# Patient Record
Sex: FEMALE | Race: WHITE | NOT HISPANIC OR LATINO | URBAN - METROPOLITAN AREA
[De-identification: names, ages, dates, MRNs, and addresses within clinical notes are randomized per-mention and may not be internally consistent; named-entity substitution may affect disease eponyms.]

---

## 2017-03-06 ENCOUNTER — COMMUNICATION - HEALTHEAST (OUTPATIENT)
Dept: INFECTIOUS DISEASES | Facility: CLINIC | Age: 82
End: 2017-03-06

## 2017-03-06 DIAGNOSIS — N39.0 RECURRENT UTI: ICD-10-CM

## 2017-03-09 ENCOUNTER — AMBULATORY - HEALTHEAST (OUTPATIENT)
Dept: INFECTIOUS DISEASES | Facility: CLINIC | Age: 82
End: 2017-03-09

## 2017-03-13 ENCOUNTER — COMMUNICATION - HEALTHEAST (OUTPATIENT)
Dept: FAMILY MEDICINE | Facility: CLINIC | Age: 82
End: 2017-03-13

## 2017-03-13 ENCOUNTER — OFFICE VISIT - HEALTHEAST (OUTPATIENT)
Dept: FAMILY MEDICINE | Facility: CLINIC | Age: 82
End: 2017-03-13

## 2017-03-13 DIAGNOSIS — R53.83 FATIGUE: ICD-10-CM

## 2017-03-13 DIAGNOSIS — K22.2 ESOPHAGEAL STRICTURE: ICD-10-CM

## 2017-03-13 LAB
ATRIAL RATE - MUSE: 65 BPM
DIASTOLIC BLOOD PRESSURE - MUSE: NORMAL MMHG
HBA1C MFR BLD: 5.6 % (ref 3.5–6)
INTERPRETATION ECG - MUSE: NORMAL
P AXIS - MUSE: 42 DEGREES
PR INTERVAL - MUSE: 170 MS
QRS DURATION - MUSE: 66 MS
QT - MUSE: 402 MS
QTC - MUSE: 418 MS
R AXIS - MUSE: 9 DEGREES
SYSTOLIC BLOOD PRESSURE - MUSE: NORMAL MMHG
T AXIS - MUSE: 27 DEGREES
VENTRICULAR RATE- MUSE: 65 BPM

## 2017-03-14 ENCOUNTER — AMBULATORY - HEALTHEAST (OUTPATIENT)
Dept: LAB | Facility: CLINIC | Age: 82
End: 2017-03-14

## 2017-03-14 ENCOUNTER — COMMUNICATION - HEALTHEAST (OUTPATIENT)
Dept: FAMILY MEDICINE | Facility: CLINIC | Age: 82
End: 2017-03-14

## 2017-03-14 DIAGNOSIS — N39.0 CHRONIC UTI: ICD-10-CM

## 2017-03-14 DIAGNOSIS — R53.83 FATIGUE: ICD-10-CM

## 2017-03-22 ENCOUNTER — COMMUNICATION - HEALTHEAST (OUTPATIENT)
Dept: FAMILY MEDICINE | Facility: CLINIC | Age: 82
End: 2017-03-22

## 2017-03-22 DIAGNOSIS — N39.0 URINARY TRACT INFECTION: ICD-10-CM

## 2017-03-31 ENCOUNTER — OFFICE VISIT - HEALTHEAST (OUTPATIENT)
Dept: NURSING | Facility: CLINIC | Age: 82
End: 2017-03-31

## 2017-03-31 DIAGNOSIS — R53.83 FATIGUE, UNSPECIFIED TYPE: ICD-10-CM

## 2017-03-31 DIAGNOSIS — I10 ESSENTIAL HYPERTENSION: ICD-10-CM

## 2017-03-31 DIAGNOSIS — Z79.899 POLYPHARMACY: ICD-10-CM

## 2017-03-31 DIAGNOSIS — I10 ESSENTIAL HYPERTENSION WITH GOAL BLOOD PRESSURE LESS THAN 140/90: ICD-10-CM

## 2017-03-31 DIAGNOSIS — N39.0 CHRONIC UTI: ICD-10-CM

## 2017-03-31 DIAGNOSIS — K58.0 IRRITABLE BOWEL SYNDROME WITH DIARRHEA: ICD-10-CM

## 2017-04-24 ENCOUNTER — COMMUNICATION - HEALTHEAST (OUTPATIENT)
Dept: FAMILY MEDICINE | Facility: CLINIC | Age: 82
End: 2017-04-24

## 2017-04-24 DIAGNOSIS — R30.0 DYSURIA: ICD-10-CM

## 2017-04-25 ENCOUNTER — OFFICE VISIT - HEALTHEAST (OUTPATIENT)
Dept: FAMILY MEDICINE | Facility: CLINIC | Age: 82
End: 2017-04-25

## 2017-04-25 DIAGNOSIS — R10.9 ABDOMINAL PAIN: ICD-10-CM

## 2017-04-25 ASSESSMENT — MIFFLIN-ST. JEOR: SCORE: 1249.91

## 2017-04-26 ENCOUNTER — RECORDS - HEALTHEAST (OUTPATIENT)
Dept: ADMINISTRATIVE | Facility: OTHER | Age: 82
End: 2017-04-26

## 2017-04-28 ENCOUNTER — COMMUNICATION - HEALTHEAST (OUTPATIENT)
Dept: FAMILY MEDICINE | Facility: CLINIC | Age: 82
End: 2017-04-28

## 2017-04-28 ENCOUNTER — AMBULATORY - HEALTHEAST (OUTPATIENT)
Dept: FAMILY MEDICINE | Facility: CLINIC | Age: 82
End: 2017-04-28

## 2017-04-28 DIAGNOSIS — N39.0 UTI DUE TO KLEBSIELLA SPECIES: ICD-10-CM

## 2017-04-28 DIAGNOSIS — B96.89 UTI DUE TO KLEBSIELLA SPECIES: ICD-10-CM

## 2017-05-01 ENCOUNTER — COMMUNICATION - HEALTHEAST (OUTPATIENT)
Dept: FAMILY MEDICINE | Facility: CLINIC | Age: 82
End: 2017-05-01

## 2017-05-23 ENCOUNTER — COMMUNICATION - HEALTHEAST (OUTPATIENT)
Dept: INFECTIOUS DISEASES | Facility: CLINIC | Age: 82
End: 2017-05-23

## 2017-05-24 ENCOUNTER — RECORDS - HEALTHEAST (OUTPATIENT)
Dept: ADMINISTRATIVE | Facility: OTHER | Age: 82
End: 2017-05-24

## 2017-06-20 ENCOUNTER — RECORDS - HEALTHEAST (OUTPATIENT)
Dept: ADMINISTRATIVE | Facility: OTHER | Age: 82
End: 2017-06-20

## 2017-08-03 ENCOUNTER — RECORDS - HEALTHEAST (OUTPATIENT)
Dept: ADMINISTRATIVE | Facility: OTHER | Age: 82
End: 2017-08-03

## 2017-08-17 ENCOUNTER — AMBULATORY - HEALTHEAST (OUTPATIENT)
Dept: NURSING | Facility: CLINIC | Age: 82
End: 2017-08-17

## 2017-08-21 ENCOUNTER — OFFICE VISIT - HEALTHEAST (OUTPATIENT)
Dept: FAMILY MEDICINE | Facility: CLINIC | Age: 82
End: 2017-08-21

## 2017-08-21 DIAGNOSIS — M25.512 SHOULDER PAIN, BILATERAL: ICD-10-CM

## 2017-08-21 DIAGNOSIS — M25.511 SHOULDER PAIN, BILATERAL: ICD-10-CM

## 2017-08-22 ENCOUNTER — COMMUNICATION - HEALTHEAST (OUTPATIENT)
Dept: FAMILY MEDICINE | Facility: CLINIC | Age: 82
End: 2017-08-22

## 2017-09-26 ENCOUNTER — COMMUNICATION - HEALTHEAST (OUTPATIENT)
Dept: FAMILY MEDICINE | Facility: CLINIC | Age: 82
End: 2017-09-26

## 2017-09-26 DIAGNOSIS — F41.1 ANXIETY STATE: ICD-10-CM

## 2017-11-19 ENCOUNTER — RECORDS - HEALTHEAST (OUTPATIENT)
Dept: ADMINISTRATIVE | Facility: OTHER | Age: 82
End: 2017-11-19

## 2017-12-06 ENCOUNTER — COMMUNICATION - HEALTHEAST (OUTPATIENT)
Dept: FAMILY MEDICINE | Facility: CLINIC | Age: 82
End: 2017-12-06

## 2017-12-06 DIAGNOSIS — I10 HTN (HYPERTENSION): ICD-10-CM

## 2018-01-15 ENCOUNTER — COMMUNICATION - HEALTHEAST (OUTPATIENT)
Dept: FAMILY MEDICINE | Facility: CLINIC | Age: 83
End: 2018-01-15

## 2018-01-29 ENCOUNTER — OFFICE VISIT - HEALTHEAST (OUTPATIENT)
Dept: FAMILY MEDICINE | Facility: CLINIC | Age: 83
End: 2018-01-29

## 2018-01-29 DIAGNOSIS — Z01.818 ENCOUNTER FOR PREOPERATIVE EXAMINATION FOR GENERAL SURGICAL PROCEDURE: ICD-10-CM

## 2018-01-29 ASSESSMENT — MIFFLIN-ST. JEOR: SCORE: 1128.63

## 2018-04-23 ENCOUNTER — COMMUNICATION - HEALTHEAST (OUTPATIENT)
Dept: FAMILY MEDICINE | Facility: CLINIC | Age: 83
End: 2018-04-23

## 2018-04-23 DIAGNOSIS — F41.1 ANXIETY STATE: ICD-10-CM

## 2018-07-31 ENCOUNTER — COMMUNICATION - HEALTHEAST (OUTPATIENT)
Dept: SCHEDULING | Facility: CLINIC | Age: 83
End: 2018-07-31

## 2018-09-05 ENCOUNTER — OFFICE VISIT - HEALTHEAST (OUTPATIENT)
Dept: FAMILY MEDICINE | Facility: CLINIC | Age: 83
End: 2018-09-05

## 2018-09-05 DIAGNOSIS — F41.1 ANXIETY STATE: ICD-10-CM

## 2018-09-05 DIAGNOSIS — G47.00 INSOMNIA: ICD-10-CM

## 2018-09-05 DIAGNOSIS — R51.9 HEADACHE: ICD-10-CM

## 2018-09-05 LAB
ALBUMIN SERPL-MCNC: 3.9 G/DL (ref 3.5–5)
ALP SERPL-CCNC: 86 U/L (ref 45–120)
ALT SERPL W P-5'-P-CCNC: 11 U/L (ref 0–45)
ANION GAP SERPL CALCULATED.3IONS-SCNC: 10 MMOL/L (ref 5–18)
AST SERPL W P-5'-P-CCNC: 15 U/L (ref 0–40)
ATRIAL RATE - MUSE: 57 BPM
BASOPHILS # BLD AUTO: 0 THOU/UL (ref 0–0.2)
BASOPHILS NFR BLD AUTO: 0 % (ref 0–2)
BILIRUB SERPL-MCNC: 0.7 MG/DL (ref 0–1)
BUN SERPL-MCNC: 14 MG/DL (ref 8–28)
CALCIUM SERPL-MCNC: 10 MG/DL (ref 8.5–10.5)
CHLORIDE BLD-SCNC: 92 MMOL/L (ref 98–107)
CO2 SERPL-SCNC: 26 MMOL/L (ref 22–31)
CREAT SERPL-MCNC: 0.75 MG/DL (ref 0.6–1.1)
DIASTOLIC BLOOD PRESSURE - MUSE: NORMAL MMHG
EOSINOPHIL # BLD AUTO: 0.1 THOU/UL (ref 0–0.4)
EOSINOPHIL NFR BLD AUTO: 2 % (ref 0–6)
ERYTHROCYTE [DISTWIDTH] IN BLOOD BY AUTOMATED COUNT: 12.3 % (ref 11–14.5)
GFR SERPL CREATININE-BSD FRML MDRD: >60 ML/MIN/1.73M2
GLUCOSE BLD-MCNC: 100 MG/DL (ref 70–125)
HCT VFR BLD AUTO: 41.9 % (ref 35–47)
HGB BLD-MCNC: 14 G/DL (ref 12–16)
INTERPRETATION ECG - MUSE: NORMAL
LYMPHOCYTES # BLD AUTO: 1.1 THOU/UL (ref 0.8–4.4)
LYMPHOCYTES NFR BLD AUTO: 24 % (ref 20–40)
MCH RBC QN AUTO: 29.5 PG (ref 27–34)
MCHC RBC AUTO-ENTMCNC: 33.4 G/DL (ref 32–36)
MCV RBC AUTO: 88 FL (ref 80–100)
MONOCYTES # BLD AUTO: 0.3 THOU/UL (ref 0–0.9)
MONOCYTES NFR BLD AUTO: 7 % (ref 2–10)
NEUTROPHILS # BLD AUTO: 3.1 THOU/UL (ref 2–7.7)
NEUTROPHILS NFR BLD AUTO: 67 % (ref 50–70)
P AXIS - MUSE: 54 DEGREES
PLATELET # BLD AUTO: 252 THOU/UL (ref 140–440)
PMV BLD AUTO: 6.8 FL (ref 7–10)
POTASSIUM BLD-SCNC: 4.5 MMOL/L (ref 3.5–5)
PR INTERVAL - MUSE: 190 MS
PROT SERPL-MCNC: 6.9 G/DL (ref 6–8)
QRS DURATION - MUSE: 68 MS
QT - MUSE: 430 MS
QTC - MUSE: 418 MS
R AXIS - MUSE: 22 DEGREES
RBC # BLD AUTO: 4.74 MILL/UL (ref 3.8–5.4)
SODIUM SERPL-SCNC: 128 MMOL/L (ref 136–145)
SYSTOLIC BLOOD PRESSURE - MUSE: NORMAL MMHG
T AXIS - MUSE: 45 DEGREES
TSH SERPL DL<=0.005 MIU/L-ACNC: 1.61 UIU/ML (ref 0.3–5)
VENTRICULAR RATE- MUSE: 57 BPM
WBC: 4.6 THOU/UL (ref 4–11)

## 2018-09-05 RX ORDER — CITALOPRAM HYDROBROMIDE 20 MG/1
20 TABLET ORAL DAILY
Qty: 90 TABLET | Refills: 3 | Status: SHIPPED | OUTPATIENT
Start: 2018-09-05

## 2018-09-05 RX ORDER — ZOLPIDEM TARTRATE 6.25 MG/1
6.25 TABLET, FILM COATED, EXTENDED RELEASE ORAL
Qty: 3 TABLET | Refills: 0 | Status: SHIPPED | OUTPATIENT
Start: 2018-09-05

## 2018-09-07 ENCOUNTER — COMMUNICATION - HEALTHEAST (OUTPATIENT)
Dept: FAMILY MEDICINE | Facility: CLINIC | Age: 83
End: 2018-09-07

## 2018-09-11 ENCOUNTER — OFFICE VISIT - HEALTHEAST (OUTPATIENT)
Dept: FAMILY MEDICINE | Facility: CLINIC | Age: 83
End: 2018-09-11

## 2018-09-11 DIAGNOSIS — E87.1 HYPONATREMIA: ICD-10-CM

## 2018-09-11 LAB
ALBUMIN SERPL-MCNC: 3.7 G/DL (ref 3.5–5)
ALP SERPL-CCNC: 79 U/L (ref 45–120)
ALT SERPL W P-5'-P-CCNC: 11 U/L (ref 0–45)
ANION GAP SERPL CALCULATED.3IONS-SCNC: 10 MMOL/L (ref 5–18)
AST SERPL W P-5'-P-CCNC: 16 U/L (ref 0–40)
BILIRUB SERPL-MCNC: 0.5 MG/DL (ref 0–1)
BUN SERPL-MCNC: 11 MG/DL (ref 8–28)
CALCIUM SERPL-MCNC: 9.8 MG/DL (ref 8.5–10.5)
CHLORIDE BLD-SCNC: 93 MMOL/L (ref 98–107)
CO2 SERPL-SCNC: 26 MMOL/L (ref 22–31)
CREAT SERPL-MCNC: 0.72 MG/DL (ref 0.6–1.1)
GFR SERPL CREATININE-BSD FRML MDRD: >60 ML/MIN/1.73M2
GLUCOSE BLD-MCNC: 98 MG/DL (ref 70–125)
POTASSIUM BLD-SCNC: 4.8 MMOL/L (ref 3.5–5)
PROT SERPL-MCNC: 6.8 G/DL (ref 6–8)
SODIUM SERPL-SCNC: 129 MMOL/L (ref 136–145)

## 2018-09-12 ENCOUNTER — AMBULATORY - HEALTHEAST (OUTPATIENT)
Dept: FAMILY MEDICINE | Facility: CLINIC | Age: 83
End: 2018-09-12

## 2018-09-12 DIAGNOSIS — E87.1 HYPONATREMIA: ICD-10-CM

## 2018-09-13 ENCOUNTER — COMMUNICATION - HEALTHEAST (OUTPATIENT)
Dept: FAMILY MEDICINE | Facility: CLINIC | Age: 83
End: 2018-09-13

## 2018-09-25 ENCOUNTER — COMMUNICATION - HEALTHEAST (OUTPATIENT)
Dept: TELEHEALTH | Facility: CLINIC | Age: 83
End: 2018-09-25

## 2018-09-25 ENCOUNTER — AMBULATORY - HEALTHEAST (OUTPATIENT)
Dept: LAB | Facility: CLINIC | Age: 83
End: 2018-09-25

## 2018-09-25 DIAGNOSIS — E87.1 HYPONATREMIA: ICD-10-CM

## 2018-09-25 LAB
CREAT SERPL-MCNC: 0.67 MG/DL (ref 0.6–1.1)
CREAT UR-MCNC: 60.6 MG/DL
FENA (FRACTIONAL EXCRETION OF SODIUM ) - HISTORICAL: 0.33 %
GFR SERPL CREATININE-BSD FRML MDRD: >60 ML/MIN/1.73M2
OSMOLALITY SERPL: 276 MOSM/KG (ref 270–300)
OSMOLALITY UR: 398 MOSM/KG (ref 300–900)
SODIUM SERPL-SCNC: 132 MMOL/L (ref 136–145)
SODIUM UR-SCNC: 127 MMOL/L

## 2018-10-01 ENCOUNTER — COMMUNICATION - HEALTHEAST (OUTPATIENT)
Dept: FAMILY MEDICINE | Facility: CLINIC | Age: 83
End: 2018-10-01

## 2019-01-07 ENCOUNTER — COMMUNICATION - HEALTHEAST (OUTPATIENT)
Dept: SCHEDULING | Facility: CLINIC | Age: 84
End: 2019-01-07

## 2019-01-07 ENCOUNTER — RECORDS - HEALTHEAST (OUTPATIENT)
Dept: ADMINISTRATIVE | Facility: OTHER | Age: 84
End: 2019-01-07

## 2019-01-20 ENCOUNTER — COMMUNICATION - HEALTHEAST (OUTPATIENT)
Dept: FAMILY MEDICINE | Facility: CLINIC | Age: 84
End: 2019-01-20

## 2019-01-20 DIAGNOSIS — I10 HTN (HYPERTENSION): ICD-10-CM

## 2019-03-15 ENCOUNTER — COMMUNICATION - HEALTHEAST (OUTPATIENT)
Dept: FAMILY MEDICINE | Facility: CLINIC | Age: 84
End: 2019-03-15

## 2019-05-21 ENCOUNTER — OFFICE VISIT - HEALTHEAST (OUTPATIENT)
Dept: FAMILY MEDICINE | Facility: CLINIC | Age: 84
End: 2019-05-21

## 2019-05-21 DIAGNOSIS — E87.1 HYPONATREMIA: ICD-10-CM

## 2019-05-22 ENCOUNTER — COMMUNICATION - HEALTHEAST (OUTPATIENT)
Dept: FAMILY MEDICINE | Facility: CLINIC | Age: 84
End: 2019-05-22

## 2019-06-06 ENCOUNTER — AMBULATORY - HEALTHEAST (OUTPATIENT)
Dept: LAB | Facility: CLINIC | Age: 84
End: 2019-06-06

## 2019-06-06 DIAGNOSIS — E87.1 HYPONATREMIA: ICD-10-CM

## 2019-06-06 LAB — SODIUM SERPL-SCNC: 132 MMOL/L (ref 136–145)

## 2019-07-09 ENCOUNTER — AMBULATORY - HEALTHEAST (OUTPATIENT)
Dept: FAMILY MEDICINE | Facility: CLINIC | Age: 84
End: 2019-07-09

## 2019-07-09 DIAGNOSIS — E87.1 HYPONATREMIA: ICD-10-CM

## 2019-07-12 ENCOUNTER — AMBULATORY - HEALTHEAST (OUTPATIENT)
Dept: LAB | Facility: CLINIC | Age: 84
End: 2019-07-12

## 2019-07-12 DIAGNOSIS — E87.1 HYPONATREMIA: ICD-10-CM

## 2019-07-12 LAB — SODIUM SERPL-SCNC: 128 MMOL/L (ref 136–145)

## 2019-07-24 ENCOUNTER — COMMUNICATION - HEALTHEAST (OUTPATIENT)
Dept: SCHEDULING | Facility: CLINIC | Age: 84
End: 2019-07-24

## 2019-07-24 DIAGNOSIS — I10 HTN (HYPERTENSION): ICD-10-CM

## 2019-07-24 RX ORDER — ATENOLOL 50 MG/1
TABLET ORAL
Qty: 180 TABLET | Refills: 0 | Status: SHIPPED | OUTPATIENT
Start: 2019-07-24

## 2019-08-08 ENCOUNTER — COMMUNICATION - HEALTHEAST (OUTPATIENT)
Dept: TELEHEALTH | Facility: CLINIC | Age: 84
End: 2019-08-08

## 2019-09-12 ENCOUNTER — COMMUNICATION - HEALTHEAST (OUTPATIENT)
Dept: FAMILY MEDICINE | Facility: CLINIC | Age: 84
End: 2019-09-12

## 2021-05-24 ENCOUNTER — RECORDS - HEALTHEAST (OUTPATIENT)
Dept: ADMINISTRATIVE | Facility: CLINIC | Age: 86
End: 2021-05-24

## 2021-05-26 ENCOUNTER — RECORDS - HEALTHEAST (OUTPATIENT)
Dept: ADMINISTRATIVE | Facility: CLINIC | Age: 86
End: 2021-05-26

## 2021-05-27 ENCOUNTER — RECORDS - HEALTHEAST (OUTPATIENT)
Dept: ADMINISTRATIVE | Facility: CLINIC | Age: 86
End: 2021-05-27

## 2021-05-28 ENCOUNTER — RECORDS - HEALTHEAST (OUTPATIENT)
Dept: ADMINISTRATIVE | Facility: CLINIC | Age: 86
End: 2021-05-28

## 2021-05-29 NOTE — TELEPHONE ENCOUNTER
I called the pharmacy and verified.  Patients do need a prescription for this medication.  It is OTC, but patients cannot buy it without a prescription.

## 2021-05-29 NOTE — PROGRESS NOTES
ASSESSMENT:  1. Hyponatremia  sodium chloride 1 gram tablet    Sodium       PLAN:  Patient has history of hyponatremia in the past, which corrected with fluid restriction and sodium chloride tablets.  We will try this again and if still not bouncing back as expected would have her see nephrology.  Discussed that there is also family history of hyponatremia and so this may be something chronic for her and worsening now given her age and other factors.  Follow for follow-up lab results in 2 weeks.  No problem-specific Assessment & Plan notes found for this encounter.      Patient Instructions   7 days sodium tablets.    Recheck sodium in 2 weeks with the lab.    Depending on result will follow up with nephrology.       Orders Placed This Encounter   Procedures     Sodium     Standing Status:   Future     Standing Expiration Date:   5/24/2020     There are no discontinued medications.    No follow-ups on file.    CHIEF COMPLAINT:  Chief Complaint   Patient presents with     Follow-up     low sodium level from cardiologist, left ear plug       HISTORY OF PRESENT ILLNESS:  Lori is a 86 y.o. female here today with follow-up for low sodium which was checked at her cardiologist's appointment recently.  She has felt not herself lately, little bit off, imbalance with walking and a little bit confused.  She was recently her cardiologist and sodium level was checked and shown to be 128.  She has had hyponatremia previously last year and was worked up for issue with sodium excretion all this was normal.  Sodium has resolved last time around with treatment with fluid restriction and sodium tablets and until now she had not had any problems again.  Her daughter is here with her today and they are wondering why this is happening in her current fashion.  There have been no recent changes to medications or anything different in her diet that would be correlated to low-sodium.    REVIEW OF SYSTEMS:        All other systems are  negative  PFSH:  Reviewed, no changes      TOBACCO USE:  Social History     Tobacco Use   Smoking Status Never Smoker   Smokeless Tobacco Never Used       VITALS:  Vitals:    05/21/19 1134 05/21/19 1136   BP: 141/62 139/55   Patient Site: Left Arm Left Arm   Patient Position: Sitting Sitting   Cuff Size: Adult Large Adult Large   Pulse: 60 60   Resp: 16    Temp: (!) 95.5  F (35.3  C) (!) 95.5  F (35.3  C)   TempSrc: Oral Oral   Weight: 161 lb 3.2 oz (73.1 kg)      Wt Readings from Last 3 Encounters:   05/21/19 161 lb 3.2 oz (73.1 kg)   09/11/18 154 lb (69.9 kg)   09/05/18 153 lb 6.4 oz (69.6 kg)       PHYSICAL EXAM:   /55 (Patient Site: Left Arm, Patient Position: Sitting, Cuff Size: Adult Large)   Pulse 60   Temp (!) 95.5  F (35.3  C) (Oral)   Resp 16   Wt 161 lb 3.2 oz (73.1 kg)   BMI 28.78 kg/m    General appearance: alert, appears stated age and cooperative  Eyes: conjunctivae/corneas clear. PERRL, EOM's intact. Fundi benign.  Ears: normal TM's and external ear canals both ears  Nose: Nares normal. Septum midline. Mucosa normal. No drainage or sinus tenderness.  Neck: no adenopathy, no carotid bruit, no JVD, supple, symmetrical, trachea midline and thyroid not enlarged, symmetric, no tenderness/mass/nodules  Lungs: clear to auscultation bilaterally  Heart: regular rate and rhythm, S1, S2 normal, no murmur, click, rub or gallop  Neurologic: Grossly normal    DATA REVIEWED:  Additional History from Old Records Summarized (2): Recent cardiology notes previous notes related to hyponatremia  Decision to Obtain Records (1): 0  Radiology Tests Summarized or Ordered (1): 0  Labs Reviewed or Ordered (1): Future sodium  Medicine Test Summarized or Ordered (1): 0  Independent Review of EKG or X-RAY(2 each): 0    The visit lasted a total of 40 minutes face to face with the patient. Over 50% of the time was spent counseling and educating the patient about plan of care.    MEDICATIONS:  Current Outpatient  Medications   Medication Sig Dispense Refill     ascorbic acid (VITAMIN C) 1000 MG tablet Take 1,000 mg by mouth daily.       atenolol (TENORMIN) 50 MG tablet Take 2 tablets (100 mg) by mouth once daily 180 tablet 1     brimonidine-timolol (COMBIGAN) 0.2-0.5 % ophthalmic solution Administer 1 drop to both eyes 2 (two) times a day.        calcium-vitamin D (CALCIUM-VITAMIN D) 500 mg(1,250mg) -200 unit per tablet Take 1 tablet by mouth daily.       citalopram (CELEXA) 20 MG tablet Take 1 tablet (20 mg total) by mouth daily. 90 tablet 3     L. acidophilus/L. rhamnosus (DIGESTIVE HEALTH PROBIOTIC ORAL) Take by mouth.       LACTOBACILLUS ACIDOPHILUS (PROBIOTIC ORAL) Take 1 capsule by mouth daily.        latanoprost (XALATAN) 0.005 % ophthalmic solution Administer 1 drop to both eyes bedtime.       UNABLE TO FIND Med Name: CBD Oil       VIT C/NITA AC/LUT/COPPER/ZNOX (PRESERVISION LUTEIN ORAL) Take 1 capsule by mouth 2 (two) times a day.       zolpidem (AMBIEN CR) 6.25 MG CR tablet Take 1 tablet (6.25 mg total) by mouth at bedtime as needed for sleep. 3 tablet 0     sodium chloride 1 gram tablet Take 1 tablet (1 g total) by mouth 2 (two) times a day for 7 days. 14 tablet 0     No current facility-administered medications for this visit.        This note has been dictated using voice recognition software. Any grammatical or context distortions are unintentional and inherent to the software

## 2021-05-29 NOTE — PATIENT INSTRUCTIONS - HE
7 days sodium tablets.    Recheck sodium in 2 weeks with the lab.    Depending on result will follow up with nephrology.

## 2021-05-29 NOTE — TELEPHONE ENCOUNTER
Medication Question or Clarification  Who is calling: Patient  What medication are you calling about? (include dose and sig)   Outpatient Medication Detail      Disp Refills Start End    sodium chloride 1 gram tablet 14 tablet 0 5/21/2019 5/28/2019    Sig - Route: Take 1 tablet (1 g total) by mouth 2 (two) times a day for 7 days. - Oral    Class: OTC    Associated Diagnoses     Hyponatremia  - Primary       Pharmacy     [id number] - [address]      Who prescribed the medication?: Dr. Ewa Mcdermott  What is your question/concern?: Patient reports no prescription is at her pharmacy. Does she need a prescription for this medication. Please advise!  Pharmacy: [facility]  Okay to leave a detailed message?: No  Site CMT - Please call the pharmacy to obtain any additional needed information.

## 2021-05-30 ENCOUNTER — HEALTH MAINTENANCE LETTER (OUTPATIENT)
Age: 86
End: 2021-05-30

## 2021-05-30 VITALS — BODY MASS INDEX: 33.39 KG/M2 | WEIGHT: 188.44 LBS | HEIGHT: 63 IN

## 2021-05-30 VITALS — BODY MASS INDEX: 33.67 KG/M2 | WEIGHT: 188.56 LBS

## 2021-05-30 NOTE — TELEPHONE ENCOUNTER
Former patient of RUBI Cheung & has not established care with another provider.  Please assign refill request to covering provider per Clinic standard process.    RN cannot approve Refill Request    RN can NOT refill this medication no PCP with [facility].     Last office visit: 5/21/2019      Papi Toro, [facility] Triage/Med Refill 7/24/2019    Requested Prescriptions   Pending Prescriptions Disp Refills     atenolol (TENORMIN) 50 MG tablet 180 tablet 1     Sig: Take 2 tablets (100 mg) by mouth once daily       Beta-Blockers Refill Protocol Passed - 7/24/2019  8:06 AM        Passed - PCP or prescribing provider visit in past 12 months or next 3 months     Last office visit with prescriber/PCP: Visit date not found OR same dept: Visit date not found OR same specialty: Visit date not found  Last physical: Visit date not found Last MTM visit: 3/31/2017 Antonio Kline, PharmD   Next visit within 3 mo: Visit date not found  Next physical within 3 mo: Visit date not found  Prescriber OR PCP: No Primary Care Provider  Last diagnosis associated with med order: 1. HTN (hypertension)  - atenolol (TENORMIN) 50 MG tablet; Take 2 tablets (100 mg) by mouth once daily  Dispense: 180 tablet; Refill: 1    If protocol passes may refill for 12 months if within 3 months of last provider visit (or a total of 15 months).             Passed - Blood pressure filed in past 12 months     BP Readings from Last 1 Encounters:   05/21/19 139/55

## 2021-05-30 NOTE — TELEPHONE ENCOUNTER
Please inform patient that refill was sent to the pharmacy  She is due to establish care with a new provider at clinic.

## 2021-05-31 ENCOUNTER — RECORDS - HEALTHEAST (OUTPATIENT)
Dept: ADMINISTRATIVE | Facility: CLINIC | Age: 86
End: 2021-05-31

## 2021-05-31 VITALS — WEIGHT: 161.7 LBS | HEIGHT: 63 IN | BODY MASS INDEX: 28.65 KG/M2

## 2021-05-31 VITALS — BODY MASS INDEX: 30.6 KG/M2 | WEIGHT: 171.4 LBS

## 2021-06-01 ENCOUNTER — RECORDS - HEALTHEAST (OUTPATIENT)
Dept: ADMINISTRATIVE | Facility: CLINIC | Age: 86
End: 2021-06-01

## 2021-06-01 VITALS — WEIGHT: 153.4 LBS | BODY MASS INDEX: 27.39 KG/M2

## 2021-06-01 VITALS — BODY MASS INDEX: 27.5 KG/M2 | WEIGHT: 154 LBS

## 2021-06-01 NOTE — TELEPHONE ENCOUNTER
Who is calling:    Patient   Reason for Call:  Wanted the clinic to know she is transferring her cares to [facility].

## 2021-06-02 ENCOUNTER — RECORDS - HEALTHEAST (OUTPATIENT)
Dept: ADMINISTRATIVE | Facility: CLINIC | Age: 86
End: 2021-06-02

## 2021-06-02 VITALS — WEIGHT: 161.2 LBS | BODY MASS INDEX: 28.78 KG/M2

## 2021-06-03 ENCOUNTER — RECORDS - HEALTHEAST (OUTPATIENT)
Dept: ADMINISTRATIVE | Facility: CLINIC | Age: 86
End: 2021-06-03

## 2021-06-09 NOTE — PROGRESS NOTES
MTM Encounter    Assessment/Plan  Chronic UTIs: Somewhat controlled, improving.  We had an extended discussion today about how antibiotic resistance happens and the importance of taking antibiotics as prescribed.  Since she has only been taking half of her prescribed cefuroxime dose, I advised her to increase to twice daily dosing as prescribed until her pills are gone.  This will result in a total of 5 days at half-strength and 7 full days at full strength.  She agrees to call the clinic if her itching worsens with this dose increase.  Otherwise she will continue with hydrocortisone cream and Vaseline, which she has had good benefit from.  When discussing her supplements I encouraged her to continue the ascorbic acid as this is likely boosting the efficacy of her methenamine, by helping acidify her urine in order to prevent UTIs.  As intravenous antibiotics are already being discussed with Lori, I suggested that an intermediate step may be doing IM gentamicin in the clinic.  Off label use of 100 mg gentamicin given IM once daily for 3 days can be an effective treatment for otherwise resistant UTIs.  - Cefuroxime 250mg BID x7d  - Consider gentamicin 100mg IM QD x3d for resistant UTIs otherwise requiring IV antibiotics    Polypharmacy: Lori has expressed significant concerns about the cost of her medications.  She would like to discontinue any supplements which are not providing benefit for her.  We discussed that multivitamin and fish oil are of somewhat limited benefit and could be considered for discontinuation.  Lori agrees that she would like to stop both of these.  She also plans to stop glucosamine per our discussion and, and if she notices worsening of her arthritis she will resume taking this at 1500 mg per day.  In order to get more effective use from her probiotic, she will only take this when she is not currently taking an antibiotic.  - stop multivitamin  - stop fish oil  - stop glucosamine;  restart if benefit at 1500mg d  - probiotic only when not taking antibiotic    Hypertension: Blood pressure is well controlled with atenolol.  It seems her fatigue is more likely due to her recent UTI than her use of a beta-blocker.  She does note some exercise fatigue, however this is not limiting for her a majority of the time.  We will continue with current therapy for now but could consider alternative agents in the future if exercise intolerance or fatigue continue to be an issue.  I am reordering her atenolol is a 100 mg tablet in order to further reduce her pill burden.  -Atenolol 100 mg once daily    Diarrhea: I am not sure what the cause of her morning daily diarrhea is.  I reviewed use of over-the-counter loperamide and instructed her to use this when she needs to leave the house in the morning.   - loperamide 2mg, repeat for loose stool    Follow Up  As requested    Subjective/Objective  Lori Graves is a 84 y.o. female here for a medication therapy management (MTM) appointment; her chief concern today is a comprehensive medication review.    Chronic UTIs: Lori has had chronic UTIs for 60 years, which have been getting more frequent as time has gone on.  She has a recent history of multiple drug resistances and was told by her doctor that she would soon need to use intravenous antibiotics for her UTIs. She was taking 100mg doxycycline for the first two weeks of the month as prophylaxis; she is not planning on taking this again because she thinks she was feeling unwell due to the doxycycline.  She had some tingling in her toes, headache, tachycardia which she relates to doxy. She last saw urology in November and is next seeing them in May '17.  Most recently, she was started on ciprofloxacin for a UTI which was found to be resistant.  She was then switched to cefuroxime BID.  She only took it once per day and occasionally took methanamine.  Since starting on the cefuroxime, she has had severe  itching of the groin, scalp and across her chest.  There was no rash associated with this.  This itching started 4-5 days ago; not sure if the same day or day after starting antibiotics.  She is using a combination of hydrocortisone and and vaseline with good relief.  She has previously used cranberry supplements with no benefit.    Hypertension: Taking two tablets of 50mg atenolol at the same time every day.  Notes some mild exercise intolerance.  Fatigue has come and gone, has been better lately.  ----------------    Lori's medication list was reviewed with them, discussing reason for use, directions for use, and potential side effects of each medication as needed. Indication, safety, efficacy, and convenience was assessed for all medications addressed above.  No environmental factors were noted currently affecting patient.  This care plan was communicated via EMR with her primary care provider, Rand Cheung MD, who is the authorizing prescriber for this visit.  Direct supervision was available by either the patient's PCP or other available provider.  Time and complexity billing metrics are included in the docflowsheet linked to this visit.  Time spent: 60 minutes      Antonio Kline, PharmD  [facility] Pharmacist  [facility]  [phone]

## 2021-06-09 NOTE — PROGRESS NOTES
"Assessment/Plan:        Diagnoses and all orders for this visit:    Fatigue  -     Ambulatory referral to Medication Management  -     Cancel: Urinalysis-UC if Indicated  -     HM2(CBC w/o Differential)  -     Comprehensive Metabolic Panel  -     Thyroid Cascade  -     Sedimentation Rate  -     Glycosylated Hemoglobin A1C  -     Electrocardiogram Perform and Read  -     BNP(B-type Natriuretic Peptide)  -     XR Chest PA and Lateral  -     Cancel: Urinalysis-UC if Indicated  -     Urinalysis-UC if Indicated; Future    Esophageal stricture  -     Ambulatory referral to Gastroenterology    Consider cardiac referral for further evaluation of heart. Medication management to evaluate polypharmacy and whether medications could be contributing to fatigue and malaise.         Subjective:    Patient ID: Lori Graves is a 84 y.o. female.    HPI patient has multiple complaints.  Fatigue: Has been ongoing for several weeks feels worse this past week.  Patient states that she just feels poorly and very tired and weak she lost her balance once this past week and fell.  Other symptoms include headaches that often are all around her head however she does state that she had a left-sided headache (in the temple region) sometime in the past couple of days.  This morning she had a headache that was all over and is now gone. Denies vision changes, muscular pains.    Heart Palpitations: Feb18th, 2017 she had an episode of heart palpitations with associated symptoms of feeling weak, fatigue, tiredness, malaise.  Patient states that she did not go into the doctor or the emergency department.  She just took extra doses of her \"heart medicines\"and it eventually went away and the next day she felt better.  Patient states that this heart palpitation occurred in the middle of the night and she is unsure how long it lasted.  She did cancel a flight to [place] in which she had planned a trip for that day.  Patient never had an episode like " these heart palpitations before, and she has not had any palpitations since.  Patient does state that she maybe has had fatigue since then.    Chronic urinary tract infections: Patient is seeing an infectious disease specialist for helping to manage her continual symptoms states that she is currently on a regimen of ciprofloxacin and doxycycline daily. Patient states that these chronic UTIs are very limiting to her lifestyle; she cancels many events, trips, appointments when the symptoms occur.      In January saw her oncologist for a evaluation re: history of breast cancer; found to be cancer free. Patient appears to be very anxious about worries about cancer and her chronic UTI sx.     Esophageal stricture: complains of history of, has had dilation at some point but now feels that she is having more choking, difficulty swallowing again, constantly has to take small bites.    The following portions of the patient's history were reviewed and updated as appropriate: allergies, current medications, past family history, past medical history, past social history, past surgical history and problem list.    Review of Systems   Constitutional: Positive for fatigue.   Cardiovascular: Positive for palpitations.        Chest tightness  When sitting and lying down across where breasts used to be.    Psychiatric/Behavioral: The patient is nervous/anxious.              Objective:    Physical Exam   Constitutional: She is oriented to person, place, and time. She appears well-developed and well-nourished.   HENT:   Head: Normocephalic and atraumatic.   Right Ear: External ear normal.   Left Ear: External ear normal.   Nose: Nose normal.   Mouth/Throat: Oropharynx is clear and moist.   Mild pain with palpation in left temple area   Eyes: Conjunctivae and EOM are normal. Pupils are equal, round, and reactive to light.   Neck: Normal range of motion. Neck supple. No tracheal deviation present. No thyromegaly present.    Cardiovascular: Normal rate, regular rhythm and normal heart sounds.    Pulmonary/Chest: Effort normal and breath sounds normal.   Musculoskeletal: Normal range of motion.   Lymphadenopathy:     She has no cervical adenopathy.   Neurological: She is alert and oriented to person, place, and time. No cranial nerve deficit. Coordination normal.   Skin: Skin is warm and dry.   Psychiatric: She has a normal mood and affect. Her behavior is normal.   Nursing note and vitals reviewed.

## 2021-06-10 NOTE — PROGRESS NOTES
Reviewed urine culture. Klebsiella resistant to all except gentamycin/tobramycin and bactrim. Patient allergic to sulfa. Contacted Dr. Saravia for further recommendation.

## 2021-06-10 NOTE — PROGRESS NOTES
SUBJECTIVE   Lori Graves is a 84 y.o. old female with a past medical history of recurrent UTI, depression, HTN who presented to clinic today for further evaluation of abdominal pain. She is on ciprofloxacin chronically for UTI prophylaxis and she has been managed by infectious disease severe. Denies N/V. Pain is located all over the abdomen, severe and she can hardly stand when the pain is bad, intermittent, comes and goes, sharp, 8-9/10, radiating to lower back and yesterday radiated to the chest especially after she took her medications yesterday. Denies change in stool habits, denies blood in stool. Denies fever but does have ongoing hot flashes for a long time. Denies chest pain, SOB. Reports sour taste in her mouth sometimes . This pain does get worse with food. This pain came on after she started taking Zicam for her cold that started last week. She's no longer using Zicam. She's on 2 weeks of Cipro alternating with 2 weeks of doxycycline for UTI prophylaxis and this was started 3 months ago. She has a history of cholecystectomy and appendectomy. She doesn't think her IBS is acting up and if anything it has been stable and not bothering her very much.  She denies constipation, reporting having soft bowel movements once to twice a day    Review of Systems:  A 12 point comprehensive review of systems was negative except as noted in HPI.    Medical History  Active Ambulatory (Non-Hospital) Problems    Diagnosis     Chronic UTI     Hiatal hernia     Nonspecific chest pain     Anxiety     Irritable bowel syndrome     Essential Hypertension     Obesity     Depression     Past Medical History:   Diagnosis Date     Arthritis      Bladder infection, chronic      Breast Cancer      Calculus of kidney      Depressive disorder, not elsewhere classified      Diarrhea      Disturbance of skin sensation      Dysfunction of eustachian tube      Dyslipidemia, goal to be determined      Essential hypertension       "Insomnia, unspecified      Irritable bowel syndrome      Lumbago      Macular degeneration (senile) of retina, unspecified      Obesity, unspecified      Postmenopausal atrophic vaginitis      Squamous cell carcinoma 5/7/2015     TIA (transient ischemic attack) 12/1/2014     Unspecified glaucoma      Unspecified vitamin D deficiency        Surgical History  She  has a past surgical history that includes removal gallbladder; conization cervix,loop electrd; knee scope,diagnostic; knee scope,diagnostic; hysteroscopy,w/endo bx; dilation/curettage,diagnostic; Mastectomy (Bilateral); and Appendectomy.    Social History  Reviewed, and she  reports that she has never smoked. She has never used smokeless tobacco. She reports that she does not drink alcohol or use illicit drugs.    Family History  Reviewed, and family history includes Heart disease in her brother and father; Heart failure in her sister; No Medical Problems in her brother, daughter, sister, and son; Pacemaker in her sister.    Medications  Reviewed and reconciled.      Allergies  Allergies   Allergen Reactions     Cefdinir Other (See Comments) and Headache     Tachycardia, fatigue.  Happens after several days of treatment.  Would be willing to try again.     Neurontin [Gabapentin] Other (See Comments)     Fatigue, chest pressure, arm pain.     Amoxicillin-Pot Clavulanate Diarrhea     Cephalexin Other (See Comments)     Tachycardia, fatigue.  Happens after several days of treatment. Would be willing to try again.     Lisinopril      Morphine      Sulfa (Sulfonamide Antibiotics) Hives     Tolerates trimethoprim     Trazodone          OBJECTIVE  Physical Exam:  Vital signs: /68  Pulse 72  Temp 97.6  F (36.4  C) (Oral)   Resp 16  Ht 5' 2.75\" (1.594 m)  Wt 188 lb 7 oz (85.5 kg)  BMI 33.65 kg/m2  Weight: 188 lb 7 oz (85.5 kg)    General appearance: pleasant, appears stated age, cooperative and in no distress  Eyes: EOMs intact, PERRL, conjunctivae " normal.   ENT: moist oral mucosa, posterior oropharynx normal.   Lymph: no cervical/supraclavicular adenopathy  Respiratory: clear to auscultation bilaterally, good air movement throughout, no wheezing or crackles, speaking full sentences without difficulty  Cardiovascular: regular rate and rhythm, no murmur appreciated, no leg edema  Abdomen: active bowel sounds, soft, non-distended, no rebound, no guarding, there's RUQ tenderness and tenderness to palpation diffusely over the lower abdominal region mostly over her bladder area, not a surgical acute abdomen  Musculoskeletal: no CVA tenderness.   Skin: no rashes  Neuro: alert oriented x 3, grossly normal otherwise  Psych: normal affect, appropriate conversation     ASSESSMENT/ PLAN    1. Abdominal pain  84 y.o. old female with a past medical history of recurrent UTI, depression, HTN who presented to clinic today for further evaluation of abdominal pain.  Given her history of recurrent urinary tract infection, she is on antibiotic prophylaxis regimen with 2 weeks of ciprofloxacin alternating with 2 weeks of doxycycline.  She is currently on Cipro.  She appears well on examination with normal vital signs and normal cardiopulmonary examination.  Abdominal examination is remarkable for right upper quadrant tenderness as well as diffuse tenderness to palpation in the lower abdominal region.  She has had a history of cholecystectomy as well as appendectomy.  Differential diagnosis includes GERD, recurrent urinary tract infection, gastroenteritis or colitis.  I do not think this is a life-threatening surgical abdomen.  Her UA shows possibility of another infection.  Will await urine culture  details her treatment.  In the meantime will empirically treat her with Zantac twice daily for possible GERD.  I do not think further lab work or imaging is indicated at this point.  Patient is comfortable with plan.   - Urinalysis-UC if Indicated  - Culture, Urine  - ranitidine  (ZANTAC) 150 MG tablet; Take 1 tablet (150 mg total) by mouth 2 (two) times a day.  Dispense: 30 tablet; Refill: 1        Stacia Short MD

## 2021-06-10 NOTE — PROGRESS NOTES
Please call patient:    Urine culture shows bacteria resistant to all antibiotics except a couple antibiotics. Please stop taking ciprofloxacin and doxycyline. I will send over for Bactrim to treat the current UTI. She needs to go see infectious disease to help with recurrent UTI  Please ask her how her abdominal pain is doing    Dr. Short

## 2021-06-10 NOTE — PROGRESS NOTES
Discussed with ID Dr. Barron who recommended Monurol 3 g PO once. If this doesn't work, will have to be on IV Ertapenum 1 g daily for a week. Prescription for Monurol 3 g PO sent to pharmacy. Patient to let me know how she's doing after this.

## 2021-06-10 NOTE — PROGRESS NOTES
Please tell patient:    The antibiotics I would like to treat you with (Bactrim) for this infection you are allergic to. Please disregard my previous message about prescribing Bactrim. I will ask your infectious disease physician what to do    Dr. Short.

## 2021-06-12 NOTE — PROGRESS NOTES
"I met with Lori Graves at the request of herself to recheck her blood pressure.  Blood pressure medications on the MAR were reviewed with patient.    Patient has taken all medications as per usual regimen: No and pt has started taking \"Natural Supplements\" recommended from [facility] office.   Patient reports tolerating them without any issues or concerns: pt reports taking new supplements ( sent copy into scanning) and removing multiple food groups. Pt reported getting dizzy and passing out a few weeks ago, and having a pounding in her head in the evening the past few days.     Vitals:    08/17/17 1612   BP: 142/68   Patient Site: Left Arm   Patient Position: Sitting   Cuff Size: Adult Large   Pulse: 68       Blood pressure was taken, previous encounter was reviewed, pt is set to follow up with MD on Monday. Pt instructed to seek urgent care or the ER if having any further symptoms.    "

## 2021-06-12 NOTE — PROGRESS NOTES
Assessment/Plan:        Diagnoses and all orders for this visit:    Shoulder pain, bilateral  -     HM1(CBC and Differential)  -     Thyroid Cascade  -     Sedimentation Rate  -     C-Reactive Protein(CRP)  -     CK Total  -     XR Shoulder Left 2 or More VWS  -     XR Shoulder Right 2 or More VWS  -     HM1 (CBC with Diff)        Continue to use Aleve as needed for pain.    On exam, she only had one tender point on the left scapular region.  She does not meet criteria for fibromyalgia of 11/18 tender points.    Differential diagnosis: Osteoarthritis, rheumatoid arthritis, internal versus external shoulder joint etiology, rotator cuff etiology had a stress test in 2016 which was totally normal.  Patient is asymptomatic for any other cardiac etiology.  Does not appear that she meets criteria for fibromyalgia.  Will evaluate further with labs and x-rays.  Consider referral to orthopedics or spine center.        Subjective:    Patient ID: Lori Graves is a 84 y.o. female.    HPI bilateral shoulder pain: Has been ongoing for a while.  States that she has difficulty with range of motion bringing her hands over her head.  Pain is described as achy pain that is worse at nighttime.  States that left shoulder is worse than right.  Pain is located all over her shoulder with some radiation into neck, a little into her arm bilaterally. Denies injury or trauma but does describe some 2 episodes of trying to open door that was difficult on her car that occurred in the spring states that she has had this pain at least that long.  Denies shortness of breath, chest pains, waking up with shortness of breath or chest pain, numbness and tingling down her arms, neck pain or neck stiffness.  Patient asks if her shoulder pain could be related to her fibromyalgia.  However when looking in her problem list and  medical history I do not see fibromyalgia listed.  Patient states that she uses Aleve on occasion when her pain is the  greatest.    Chronic UTI: Patient was referred over to [facility] and was seen in NP either.  She was tested for many micronutrients and food sensitivities.  Was found to have a large list of foods she was sensitive to and some micronutrient deficiencies.  She was put on a list of supplements to correct the micronutrient deficiency and told to avoid the list of foods that she was sensitive too.  Patient states that since then her diarrhea has gone away, she has not had a UTI since she started this list of supplements and natural pathic medications and states that she feels much better.  Now since she has not had this frequent UTIs she is considering going in to have her cataracts removed and to get the procedure done for an esophageal stricture.    The following portions of the patient's history were reviewed and updated as appropriate: allergies, current medications, past family history, past medical history, past social history, past surgical history and problem list.    Review of Systems   Respiratory: Negative.    Cardiovascular: Negative.    Gastrointestinal: Negative.    Genitourinary: Negative.    Musculoskeletal: Positive for arthralgias and back pain. Negative for gait problem, joint swelling, myalgias, neck pain and neck stiffness.        Bilateral shoulder pain             Objective:    Physical Exam  General: Patient appears to be in no acute distress.  Neck: Full range of motion, no pain with palpation of spine or paraspinal muscles.  Bilateral upper extremities: Normal range of motion without pain today. Pain with palpation of AC joint bilaterally.  Pain with strength testing of internal and external rotation.  Musculoskeletal: Tender point found in left scapula.  No other tender points were found with palpation of the 18 tender points of fibromyalgia.

## 2021-06-15 PROBLEM — N39.0 CHRONIC UTI: Status: ACTIVE | Noted: 2017-03-14

## 2021-06-15 NOTE — PROGRESS NOTES
Preoperative Exam    Scheduled Procedure: Cataract  Surgery Date:  Left eye on 1/30 & Right eye on 2/27  Surgery Location: [facility] Fax: [phone]    Surgeon:  Dr Robertson    Assessment/Plan:     1. Encounter for preoperative examination for general surgical procedure  Cataract surgery. No lab or EKG indicated. Continue taking medications as prescribed.     Surgical Procedure Risk: Low (reported cardiac risk generally < 1%)  Have you had prior anesthesia?: No  Have you or any family members had a previous anesthesia reaction:  No  Do you or any family members have a history of a clotting or bleeding disorder?: No  Cardiac Risk Assessment: no increased risk for major cardiac complications    Patient approved for surgery with general or local anesthesia.    No special consideration    Functional Status: Independent  Patient plans to recover at home with family.     Subjective:      Lori Graves is a 84 y.o. female who presents for a preoperative consultation.  She's been having cold symptoms including runny nose and sweaty but hasn't had any fever, sore throat, cough, ear pain, SOB or chest pain with exertion. Symptoms have been ongoing for a few days.     All other systems reviewed and are negative, other than those listed in the HPI.    Pertinent History  Do you have difficulty breathing or chest pain after walking up a flight of stairs: Yes: she just gets a little bit winded from time to time but no chest pain with exertion.   History of obstructive sleep apnea: No  Steroid use in the last 6 months: No  Frequent Aspirin/NSAID use: No  Prior Blood Transfusion: No  Prior Blood Transfusion Reaction: No  If for some reason prior to, during or after the procedure, if it is medically indicated, would you be willing to have a blood transfusion?:  There is no transfusion refusal.    Current Outpatient Prescriptions   Medication Sig Dispense Refill     ascorbic acid (VITAMIN C) 1000 MG tablet Take 1,000  mg by mouth daily.       atenolol (TENORMIN) 50 MG tablet Take 2 tablets (100 mg) by mouth once daily 180 tablet 2     calcium-vitamin D (CALCIUM-VITAMIN D) 500 mg(1,250mg) -200 unit per tablet Take 1 tablet by mouth daily.       citalopram (CELEXA) 20 MG tablet TAKE 1&1/2 TABLETS (30 MG) BY MOUTH ONCE DAILY  45 tablet 5     LACTOBACILLUS ACIDOPHILUS (PROBIOTIC ORAL) Take 1 capsule by mouth daily.        latanoprost (XALATAN) 0.005 % ophthalmic solution Administer 1 drop to both eyes bedtime.       VIT C/NITA AC/LUT/COPPER/ZNOX (PRESERVISION LUTEIN ORAL) Take 1 capsule by mouth 2 (two) times a day.       brimonidine-timolol (COMBIGAN) 0.2-0.5 % ophthalmic solution Administer 1 drop to both eyes 2 (two) times a day.        estradiol (ESTRACE) 0.01 % (0.1 mg/gram) vaginal cream Insert 2 g into the vagina daily.       GLUCOSAMINE HCL/CHONDR LONGORIA A NA (OSTEO BI-FLEX ORAL) Take 1 capsule by mouth daily.       No current facility-administered medications for this visit.         Allergies   Allergen Reactions     Cefdinir Other (See Comments) and Headache     Tachycardia, fatigue.  Happens after several days of treatment.  Would be willing to try again.     Neurontin [Gabapentin] Other (See Comments)     Fatigue, chest pressure, arm pain.     Amoxicillin-Pot Clavulanate Diarrhea     Cephalexin Other (See Comments)     Tachycardia, fatigue.  Happens after several days of treatment. Would be willing to try again.     Lisinopril      Morphine      Sulfa (Sulfonamide Antibiotics) Hives     Tolerates trimethoprim     Trazodone        Patient Active Problem List   Diagnosis     Anxiety     Irritable bowel syndrome     Essential Hypertension     Obesity     Depression     Nonspecific chest pain     Hiatal hernia     Chronic UTI       Past Medical History:   Diagnosis Date     Arthritis      Bladder infection, chronic      Breast Cancer     Tootie Merari: age 59, treated with mastectomy and then chemo for 6 months      Calculus of  "kidney      Depressive disorder, not elsewhere classified      Diarrhea      Disturbance of skin sensation      Dysfunction of eustachian tube      Dyslipidemia, goal to be determined      Essential hypertension      Insomnia, unspecified      Irritable bowel syndrome      Lumbago      Macular degeneration (senile) of retina, unspecified      Obesity, unspecified      Postmenopausal atrophic vaginitis      Squamous cell carcinoma 5/7/2015     TIA (transient ischemic attack) 12/1/2014     Unspecified glaucoma      Unspecified vitamin D deficiency        Past Surgical History:   Procedure Laterality Date     APPENDECTOMY       MASTECTOMY Bilateral      ID CONIZATION CERVIX,LOOP ELECTRD      Description: Cervical Conization Loop Electrode Excision;  Recorded: 10/09/2007;     ID DILATION/CURETTAGE,DIAGNOSTIC      Description: Dilation And Curettage;  Recorded: 10/09/2007;     ID HYSTEROSCOPY,W/ENDO BX      Description: Hysteroscopy;  Recorded: 10/09/2007;     ID KNEE SCOPE,DIAGNOSTIC      Description: Arthroscopy Knee Left;  Recorded: 10/09/2007;     ID KNEE SCOPE,DIAGNOSTIC      Description: Arthroscopy Knee Right;  Recorded: 10/09/2007;     ID REMOVAL GALLBLADDER      Description: Cholecystectomy;  Recorded: 10/09/2007;       Social History     Social History     Marital status:      Spouse name: N/A     Number of children: 2     Years of education: N/A     Occupational History     Not on file.     Social History Main Topics     Smoking status: Never Smoker     Smokeless tobacco: Never Used     Alcohol use No     Drug use: No     Sexual activity: Not Currently     Other Topics Concern     Not on file     Social History Narrative    Lives alone in a townhouse.         Objective:     Vitals:    01/29/18 1115   BP: 124/70   Pulse: 64   Resp: 18   Temp: 97.7  F (36.5  C)   TempSrc: Oral   Weight: 161 lb 11.2 oz (73.3 kg)   Height: 5' 2.75\" (1.594 m)         Physical Exam:  /70  Pulse 64  Temp 97.7  F (36.5 " " C) (Oral)   Resp 18  Ht 5' 2.75\" (1.594 m)  Wt 161 lb 11.2 oz (73.3 kg)  BMI 28.87 kg/m2    General Appearance:    Alert, cooperative, no distress, appears stated age   Head:    Normocephalic, without obvious abnormality, atraumatic   Eyes:    PERRL, conjunctiva/corneas clear, EOM's intact, fundi     benign, both eyes   Ears:    Normal TM's and external ear canals, both ears   Nose:   Nares normal, septum midline, mucosa normal, no drainage     or sinus tenderness   Throat:   Lips, mucosa, and tongue normal; teeth and gums normal   Neck:   Supple, symmetrical, trachea midline, no adenopathy;     thyroid:  no enlargement/tenderness/nodules; no carotid    bruit or JVD   Back:     Symmetric, no curvature, ROM normal, no CVA tenderness   Lungs:     Clear to auscultation bilaterally, respirations unlabored   Chest Wall:    No tenderness or deformity    Heart:    Regular rate and rhythm, S1 and S2 normal, no murmur, rub    or gallop   Breast Exam:    No tenderness, masses, or nipple abnormality   Abdomen:     Soft, non-tender, bowel sounds active all four quadrants,     no masses, no organomegaly   Genitalia:    Normal female without lesion, discharge or tenderness   Rectal:    Normal tone, no masses or tenderness; guaiac negative stool   Extremities:   Extremities normal, atraumatic, no cyanosis or edema   Pulses:   2+ and symmetric all extremities   Skin:   Skin color, texture, turgor normal, no rashes or lesions   Lymph nodes:   Cervical, supraclavicular, and axillary nodes normal   Neurologic:   CNII-XII intact, normal strength, sensation and reflexes     throughout         Patient Instructions     Hold all supplements, aspirin and NSAIDs for 7 days prior to surgery.  Follow your surgeon's direction on when to stop eating and drinking prior to surgery.      NO EKG ordered this visit.    Labs:  No labs were ordered during this visit    Immunization History   Administered Date(s) Administered     DT (pediatric) " 08/15/2006     Influenza high dose, seasonal 10/16/2015, 11/11/2016     Influenza, Seasonal, Inj PF IIV3 10/30/2013     Influenza, inj, historic,unspecified 10/29/2007, 09/08/2010     Influenza, seasonal,quad inj 6-35 mos 09/08/2011, 11/07/2012, 10/14/2014     Pneumo Conj 13-V (2010&after) 05/07/2015     Pneumo Polysac 23-V 07/01/2002     Tdap 08/19/2011     ZOSTER 08/05/2011           Electronically signed by Stacia Short MD 01/29/18 11:12 AM

## 2021-06-20 NOTE — PROGRESS NOTES
ASSESSMENT:  1. Hyponatremia  Comprehensive Metabolic Panel       PLAN:  Follow up in sodium levels today, we will get back to the patient when these are available.  This case with Dr. Cheung but discussed likelihood of a fluid restriction, potential sodium tablets and some follow-up lab work to come.  Patient has restricted the sodium in her diet fairly closely and so this may be the culprit but need to rule out other causes.  No problem-specific Assessment & Plan notes found for this encounter.    The following high BMI interventions were performed this visit: encouragement to exercise and weight monitoring  There are no Patient Instructions on file for this visit.    Orders Placed This Encounter   Procedures     Comprehensive Metabolic Panel     There are no discontinued medications.    No Follow-up on file.    CHIEF COMPLAINT:  Chief Complaint   Patient presents with     Follow-up     low sodium        HISTORY OF PRESENT ILLNESS:  Lori is a 85 y.o. female here today for evaluation of low sodium.  The patient had seen our other nurse practitioner Crystal few days ago for insomnia, anxiety and headache.  On laboratory findings they did find low sodium at 128.  She was recommended to follow-up with us or her primary again after this lab was discovered to further workup hyponatremia.  Certainly headaches and other symptoms of fogginess, anxiety could be partially attributed to hyponatremia.  Symptoms that were present at the visit on 5 September are still present today, she complains of difficulty sleeping, headaches and some fogginess.  Daughter agrees that she is not as sharp as usual.  She did take herself recently off of her Celexa and so that has been restarted.  We did discuss how that could affect the symptoms as well.  Discussed that not many of her medications would contribute to hyponatremia and so there is nothing obviously causing this.  In discussion of diet and fluid intake, the patient does not  take quite a bit of water and other fluids that can contribute to these symptoms, she also has decreased sodium in the diet almost completely, she does need any process foods since revamping her diet to help with some GI issues that have been ongoing.  She does state over the past week or so she has had more diarrhea so unsure how that would contribute to hyponatremia.  She also follows with a naturopathic physician, she takes some supplements from them and we did discuss that although her natural path states none of these could cause hyponatremia I am not familiar enough with them to agree.    REVIEW OF SYSTEMS:      Pertinent items are noted in HPI.  All other systems are negative  PFSH:  Reviewed, no changes      TOBACCO USE:  History   Smoking Status     Never Smoker   Smokeless Tobacco     Never Used       VITALS:  Vitals:    09/11/18 1523   BP: 165/71   Patient Site: Left Arm   Patient Position: Sitting   Cuff Size: Adult Regular   Pulse: 64   Resp: 14   Weight: 154 lb (69.9 kg)     Wt Readings from Last 3 Encounters:   09/11/18 154 lb (69.9 kg)   09/05/18 153 lb 6.4 oz (69.6 kg)   01/29/18 161 lb 11.2 oz (73.3 kg)       PHYSICAL EXAM:   /71 (Patient Site: Left Arm, Patient Position: Sitting, Cuff Size: Adult Regular)  Pulse 64  Resp 14  Wt 154 lb (69.9 kg)  BMI 27.5 kg/m2  General appearance: alert, appears stated age and cooperative  Ears: normal TM's and external ear canals both ears  Throat: lips, mucosa, and tongue normal; teeth and gums normal  Neck: no adenopathy, no carotid bruit, no JVD, supple, symmetrical, trachea midline and thyroid not enlarged, symmetric, no tenderness/mass/nodules  Lungs: clear to auscultation bilaterally  Heart: regular rate and rhythm, S1, S2 normal, no murmur, click, rub or gallop  Neurologic: Grossly normal  Psychologic: Mood and affect normal.      DATA REVIEWED:  Additional History from Old Records Summarized (2): reviewed previous visit with NP  Decision to  Obtain Records (1): 0  Radiology Tests Summarized or Ordered (1): 0  Labs Reviewed or Ordered (1): 0\1  Medicine Test Summarized or Ordered (1): 0  Independent Review of EKG or X-RAY(2 each): 0    The visit lasted a total of 30 minutes face to face with the patient. Over 50% of the time was spent counseling and educating the patient about plan of care.    MEDICATIONS:  Current Outpatient Prescriptions   Medication Sig Dispense Refill     ascorbic acid (VITAMIN C) 1000 MG tablet Take 1,000 mg by mouth daily.       atenolol (TENORMIN) 50 MG tablet Take 2 tablets (100 mg) by mouth once daily 180 tablet 2     brimonidine-timolol (COMBIGAN) 0.2-0.5 % ophthalmic solution Administer 1 drop to both eyes 2 (two) times a day.        calcium-vitamin D (CALCIUM-VITAMIN D) 500 mg(1,250mg) -200 unit per tablet Take 1 tablet by mouth daily.       citalopram (CELEXA) 20 MG tablet Take 1 tablet (20 mg total) by mouth daily. 90 tablet 3     L. acidophilus/L. rhamnosus (DIGESTIVE HEALTH PROBIOTIC ORAL) Take by mouth.       LACTOBACILLUS ACIDOPHILUS (PROBIOTIC ORAL) Take 1 capsule by mouth daily.        latanoprost (XALATAN) 0.005 % ophthalmic solution Administer 1 drop to both eyes bedtime.       UNABLE TO FIND Med Name: CBD Oil       VIT C/NITA AC/LUT/COPPER/ZNOX (PRESERVISION LUTEIN ORAL) Take 1 capsule by mouth 2 (two) times a day.       sodium chloride 1 gram tablet Take 1 tablet (1 g total) by mouth 2 (two) times a day for 7 days. 14 tablet 0     zolpidem (AMBIEN CR) 6.25 MG CR tablet Take 1 tablet (6.25 mg total) by mouth at bedtime as needed for sleep. 3 tablet 0     No current facility-administered medications for this visit.        This note has been dictated using voice recognition software. Any grammatical or context distortions are unintentional and inherent to the software

## 2021-06-20 NOTE — PROGRESS NOTES
Assessment/Plan:        Diagnoses and all orders for this visit:    Insomnia  -     Electrocardiogram Perform and Read  -     HM1(CBC and Differential)  -     Comprehensive Metabolic Panel  -     Thyroid Cascade  -     zolpidem (AMBIEN CR) 6.25 MG CR tablet; Take 1 tablet (6.25 mg total) by mouth at bedtime as needed for sleep.  Dispense: 3 tablet; Refill: 0  -     HM1 (CBC with Diff)    Anxiety  -     citalopram (CELEXA) 20 MG tablet; Take 1 tablet (20 mg total) by mouth daily.  Dispense: 90 tablet; Refill: 3  -     Electrocardiogram Perform and Read    Headache  -     Electrocardiogram Perform and Read  -     HM1(CBC and Differential)  -     Comprehensive Metabolic Panel  -     Thyroid Cascade  -     HM1 (CBC with Diff)    Patient is hesitant to start any new medications but I don't feel she is getting much benefit in Advil PM and the benadryl the next day is causing other symptoms. I recommend she stop taking the Advil PM consider a few nights of Ambien. Improve sleep hygiene. I will check in with PharmD to see if they have any other thoughts on a medicine that would work better without the risks. Her headaches seem more tension type, would like her to stop the Advil to see if it improves without the NSAIDS.        Subjective:    Patient ID: Lori Graves is a 85 y.o. female.    HPI patient comes in the clinic today for multiple complaints.  She is also here with her daughter.  Insomnia: This is patient's main complaint today.  The history is she has had problems with insomnia in the past because major life upheaval.  She was placed on Celexa 20 mg once daily which she states totally resolved her insomnia.  She sees a natural pathic doctor for her  chronic UTIs.  She was seen by Dr. Barron and infectious disease for a long time to try and manage her chronic urinary tract infections and was told that antibiotics were not useful to her.  She she found encephalopathic doctor who found many food sensitivities  "which soon as she stopped eating those types of food she is not having urinary tract infections.  The end of May beginning of June her left atrial pathic DrAvinash took her off citalopram/Celexa because she was told that it was not good for her.  Since then she has had difficulty sleeping again.  She states her insomnia problems include falling asleep and staying asleep.  She wakes up in the middle of night to go the bathroom and has difficulty going back to sleep.  She is incredibly fatigued throughout the day due to her inability to sleep.  She states she has headaches, she feels weak and shaky, very unsteady on her feet.  She states that she has tried melatonin without relief and the only thing that helps her a little bit falling asleep is Advil PM which she takes at nighttime 2 pills but she states that the next morning she feels groggy shaky weak and unsteady on her feet.  She feels that one Celexa is fully interbody she will feel better but she would like some help with sleeping in the meantime.  She states the nights that she is unable to sleep are \"horrible\".    Headaches:Headache started in the middle of all this is rated 8 out of 10 at worst comes and goes is described as dull sharp it starts above her nose around her eyes radiates around temples and to the top of her head.  She denies any  light or noise or smells, sensitivity nausea, vomiting, aura.  Her    Natural doctor has put her on CBD oil without any improvement in her symptoms.    The following portions of the patient's history were reviewed and updated as appropriate: allergies, current medications, past family history, past medical history, past social history, past surgical history and problem list.    Review of Systems  See HPI        Objective:    Physical Exam  /69 (Patient Site: Left Arm, Patient Position: Sitting, Cuff Size: Adult Large)  Pulse 65  Temp (!) 96.3  F (35.7  C) (Oral)   Resp 16  Wt 153 lb 6.4 oz (69.6 kg)  BMI 27.39 " kg/m2  General Appearance:  Alert, cooperative, no distress, appears stated age   Head:  Normocephalic, without obvious abnormality, atraumatic   Eyes:  PERRL, conjunctiva/corneas clear, EOM's intact   Ears:  Normal TM's and external ear canals, both ears   Throat: Lips, mucosa, and tongue normal; teeth and gums normal   Neck: Supple, symmetrical, trachea midline, no adenopathy, thyroid: not enlarged, symmetric, no tenderness/mass/nodules   Back:   Symmetric, no curvature, ROM normal, no CVA tenderness   Lungs:   Clear to auscultation bilaterally, respirations unlabored   Chest Wall:  No tenderness or deformity   Heart:  Regular rate and rhythm, S1, S2 normal, no murmur, rub or gallop   Extremities: Extremities normal, atraumatic, no cyanosis or edema   Skin: Skin color, texture, turgor normal, no rashes or lesions   Lymph nodes: Cervical and supraclavicular normal   Neurologic: Normal,Coordinated, smooth gait, balance, rapid alternating movements, sensory functioning, and cranial nerves II-XII grossly intact. DTR's+2 bilaterally brachioradialis, knee, ankle.

## 2021-06-22 NOTE — TELEPHONE ENCOUNTER
"RN Triage:   Patient calling in with cold and flu like symptoms since before [place]. Patient have pain in her chest and tightness with breathing. Patient stated that she can hardly walk she is so weak, this is in the morning and reported she \"perks up in the afternoon\". Patient reports she is not struggling to breath but stated she is short of breath. Per patient she is having chest pains that are worse with laying down. Patient stated the pain in her chest is in the middle of the chest and it can come and go. Patient having headaches that come and go. No fever noted. Patient has chills and sweating. Patient advised to be seen ion the UR as she stated she did not want to go into the ED. Patient stated she will call her daughter and have her some over to take her in for evaluation.   Rebeca Carmen RN, BSN [facility] Triage Nurse    Reason for Disposition    Recent illness requiring prolonged bed rest (i.e., immobilization)    Difficulty breathing    Protocols used: CHEST PAIN-A-OH, WEAKNESS (GENERALIZED) AND FATIGUE-A-OH      "

## 2021-06-23 NOTE — TELEPHONE ENCOUNTER
Refill Approved    Rx renewed per Medication Renewal Policy. Medication was last renewed on 12/9/2017 for 180/2  Last OV 9/11/2018    Mónica Cook, [facility] Triage/Med Refill 1/20/2019     Requested Prescriptions   Pending Prescriptions Disp Refills     atenolol (TENORMIN) 50 MG tablet 180 tablet 2     Sig: Take 2 tablets (100 mg) by mouth once daily    Beta-Blockers Refill Protocol Passed - 1/20/2019  8:24 PM       Passed - PCP or prescribing provider visit in past 12 months or next 3 months    Last office visit with prescriber/PCP: 6/1/2016 Rand Cheung MD OR same dept: 9/11/2018 Ewa Mcdermott FNP OR same specialty: 9/11/2018 Ewa Mcdermott FNP  Last physical: 9/7/2016 Last MTM visit: Visit date not found   Next visit within 3 mo: Visit date not found  Next physical within 3 mo: Visit date not found  Prescriber OR PCP: Rand Cheung MD  Last diagnosis associated with med order: 1. HTN (hypertension)  - atenolol (TENORMIN) 50 MG tablet; Take 2 tablets (100 mg) by mouth once daily  Dispense: 180 tablet; Refill: 2    If protocol passes may refill for 12 months if within 3 months of last provider visit (or a total of 15 months).            Passed - Blood pressure filed in past 12 months    BP Readings from Last 1 Encounters:   09/11/18 165/71

## 2021-07-13 ENCOUNTER — RECORDS - HEALTHEAST (OUTPATIENT)
Dept: ADMINISTRATIVE | Facility: CLINIC | Age: 86
End: 2021-07-13

## 2021-07-14 ENCOUNTER — RECORDS - HEALTHEAST (OUTPATIENT)
Dept: ADMINISTRATIVE | Facility: CLINIC | Age: 86
End: 2021-07-14

## 2021-09-19 ENCOUNTER — HEALTH MAINTENANCE LETTER (OUTPATIENT)
Age: 86
End: 2021-09-19

## 2022-06-26 ENCOUNTER — HEALTH MAINTENANCE LETTER (OUTPATIENT)
Age: 87
End: 2022-06-26

## 2022-11-21 ENCOUNTER — HEALTH MAINTENANCE LETTER (OUTPATIENT)
Age: 87
End: 2022-11-21

## 2023-07-08 ENCOUNTER — HEALTH MAINTENANCE LETTER (OUTPATIENT)
Age: 88
End: 2023-07-08

## 2025-01-04 ENCOUNTER — HEALTH MAINTENANCE LETTER (OUTPATIENT)
Age: OVER 89
End: 2025-01-04

## 2025-01-14 ENCOUNTER — LAB REQUISITION (OUTPATIENT)
Dept: LAB | Facility: CLINIC | Age: OVER 89
End: 2025-01-14
Payer: COMMERCIAL

## 2025-01-14 DIAGNOSIS — I10 ESSENTIAL (PRIMARY) HYPERTENSION: ICD-10-CM

## 2025-01-15 LAB
ALBUMIN SERPL BCG-MCNC: 3.8 G/DL (ref 3.5–5.2)
ALP SERPL-CCNC: 49 U/L (ref 40–150)
ALT SERPL W P-5'-P-CCNC: 6 U/L (ref 0–50)
ANION GAP SERPL CALCULATED.3IONS-SCNC: 9 MMOL/L (ref 7–15)
AST SERPL W P-5'-P-CCNC: 16 U/L (ref 0–45)
BILIRUB SERPL-MCNC: 0.3 MG/DL
BUN SERPL-MCNC: 8.4 MG/DL (ref 8–23)
CALCIUM SERPL-MCNC: 9.6 MG/DL (ref 8.8–10.4)
CHLORIDE SERPL-SCNC: 103 MMOL/L (ref 98–107)
CREAT SERPL-MCNC: 0.74 MG/DL (ref 0.51–0.95)
EGFRCR SERPLBLD CKD-EPI 2021: 76 ML/MIN/1.73M2
ERYTHROCYTE [DISTWIDTH] IN BLOOD BY AUTOMATED COUNT: 13.3 % (ref 10–15)
GLUCOSE SERPL-MCNC: 104 MG/DL (ref 70–99)
HCO3 SERPL-SCNC: 27 MMOL/L (ref 22–29)
HCT VFR BLD AUTO: 37.1 % (ref 35–47)
HGB BLD-MCNC: 11.3 G/DL (ref 11.7–15.7)
MCH RBC QN AUTO: 28.2 PG (ref 26.5–33)
MCHC RBC AUTO-ENTMCNC: 30.5 G/DL (ref 31.5–36.5)
MCV RBC AUTO: 93 FL (ref 78–100)
PLATELET # BLD AUTO: 196 10E3/UL (ref 150–450)
POTASSIUM SERPL-SCNC: 3.6 MMOL/L (ref 3.4–5.3)
PROT SERPL-MCNC: 5.9 G/DL (ref 6.4–8.3)
RBC # BLD AUTO: 4.01 10E6/UL (ref 3.8–5.2)
SODIUM SERPL-SCNC: 139 MMOL/L (ref 135–145)
WBC # BLD AUTO: 3.1 10E3/UL (ref 4–11)

## 2025-01-15 PROCEDURE — 36415 COLL VENOUS BLD VENIPUNCTURE: CPT | Mod: ORL | Performed by: REGISTERED NURSE

## 2025-01-15 PROCEDURE — 85027 COMPLETE CBC AUTOMATED: CPT | Mod: ORL | Performed by: REGISTERED NURSE

## 2025-01-15 PROCEDURE — 80053 COMPREHEN METABOLIC PANEL: CPT | Mod: ORL | Performed by: REGISTERED NURSE

## 2025-01-15 PROCEDURE — P9603 ONE-WAY ALLOW PRORATED MILES: HCPCS | Mod: ORL | Performed by: REGISTERED NURSE
